# Patient Record
Sex: MALE | Race: WHITE
[De-identification: names, ages, dates, MRNs, and addresses within clinical notes are randomized per-mention and may not be internally consistent; named-entity substitution may affect disease eponyms.]

---

## 2018-06-14 ENCOUNTER — HOSPITAL ENCOUNTER (OUTPATIENT)
Dept: HOSPITAL 75 - RAD | Age: 48
End: 2018-06-14
Attending: NURSE PRACTITIONER
Payer: COMMERCIAL

## 2018-06-14 DIAGNOSIS — R10.84: Primary | ICD-10-CM

## 2018-06-14 PROCEDURE — 76700 US EXAM ABDOM COMPLETE: CPT

## 2018-06-14 NOTE — DIAGNOSTIC IMAGING REPORT
PROCEDURE: US abdomen complete.



TECHNIQUE: Multiple real-time grayscale images were obtained over

the abdomen in various projections.



INDICATION: Generalized abdominal pain.



The pancreas has a normal appearance. The liver is normal in

size. No discrete liver mass is identified. Gallbladder is

without stones or sludge. No wall thickening or pericholecystic

fluid is seen. No biliary duct dilatation is identified. The

right and left kidneys are unremarkable. Aorta and IVC are

unremarkable. There is no ascites.



IMPRESSION: Unremarkable abdominal ultrasound.



Dictated by: 



  Dictated on workstation # [id number]

## 2022-02-22 ENCOUNTER — HOSPITAL ENCOUNTER (OUTPATIENT)
Dept: HOSPITAL 75 - RAD | Age: 52
End: 2022-02-22
Attending: NURSE PRACTITIONER
Payer: COMMERCIAL

## 2022-02-22 DIAGNOSIS — M89.8X7: ICD-10-CM

## 2022-02-22 DIAGNOSIS — L02.611: Primary | ICD-10-CM

## 2022-02-22 PROCEDURE — 73660 X-RAY EXAM OF TOE(S): CPT

## 2022-02-22 PROCEDURE — 73630 X-RAY EXAM OF FOOT: CPT

## 2022-02-22 NOTE — DIAGNOSTIC IMAGING REPORT
INDICATION:  ABSCESS RT FOOT BETWEEN 4/5 TOES SEVERE 4TH TOE BONE

PAIN.



TECHNIQUE: 3 views of the right foot  



CORRELATION STUDY: 

None



FINDINGS:  

No acute fracture. There is slight blunting at the tip of the 5th

distal phalanx. The remaining osseous structures are otherwise

intact. Joint space is maintained.  No abnormal soft tissue gas

collection or radiopaque foreign body.



IMPRESSION:

1. Slight blunting of the tip of distal 5th phalanx. While this

may be normal variation, some degree of bony resorption including

osteomyelitis is not excluded. Remaining osseous structures are

otherwise intact and unremarkable.



Dictated by: 



  Dictated on workstation # [id number]

## 2022-02-22 NOTE — DIAGNOSTIC IMAGING REPORT
INDICATION: 

Soft tissue abscess. 



COMPARISON: 

Foot exam from same day.



FINDINGS: 

Three views of the right toes were obtained and show no

fractures, dislocations, or other acute bony abnormalities. Joint

spaces are well maintained throughout. The soft tissues appear

unremarkable. No radiopaque foreign bodies are identified.



IMPRESSION: 

Unremarkable radiographic exam of the toes of the right foot.

Please note, osteomyelitis cannot be excluded based on

radiographs alone. If there is concern for osteomyelitis, MRI is

recommended. If MRI is contraindicated, a triple phase bone scan

could be performed.



Dictated by: 



  Dictated on workstation # [id number]

## 2022-03-01 ENCOUNTER — HOSPITAL ENCOUNTER (OUTPATIENT)
Dept: HOSPITAL 75 - RAD | Age: 52
End: 2022-03-01
Attending: NURSE PRACTITIONER
Payer: COMMERCIAL

## 2022-03-01 DIAGNOSIS — L02.611: Primary | ICD-10-CM

## 2022-03-01 DIAGNOSIS — M86.271: ICD-10-CM

## 2022-03-01 NOTE — DIAGNOSTIC IMAGING REPORT
Exam: MRI right foot without contrast.



Date: March 1, 2022.



Indication: 51-year-old male, concern for abscess or ulcer

between the fourth and fifth toes of the right foot. Right foot

pain.



Comparison: Radiographs February 22, 2022.



Technique: Multiple noncontrast MRI sequences of the right foot

were obtained.



Findings:



Lisfranc ligament proper is intact. The imaged portions of the

peroneal tendons, anterior extensor tendons, and posterior flexor

tendons are intact. There is no evidence of tenosynovitis. The

visualized portions of the plantar fascia are intact.



There is no identified T1 marrow signal loss or bone destruction.

There is no identified marrow edema. There is no acute fracture.



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

joint. The additional joint spaces are well preserved. There is

no joint effusion.



There is no identified abnormal soft tissue edema or focal fluid

collection. Sensitivity for detection of abscess is somewhat

reduced given lack of intravenous contrast.



Impression:

1. No identified focal fluid collection or abscess.

2. No evidence of osteomyelitis.

3. Otherwise identified acute abnormality at the level of the

right foot.



Dictated by: 



  Dictated on workstation # [id number]

## 2023-06-27 ENCOUNTER — HOSPITAL ENCOUNTER (OUTPATIENT)
Dept: HOSPITAL 75 - CARD | Age: 53
End: 2023-06-27
Attending: SURGERY
Payer: COMMERCIAL

## 2023-06-27 DIAGNOSIS — R10.11: Primary | ICD-10-CM

## 2023-06-27 PROCEDURE — 78227 HEPATOBIL SYST IMAGE W/DRUG: CPT

## 2023-06-27 NOTE — DIAGNOSTIC IMAGING REPORT
INDICATION: Right upper quadrant pain



COMPARISON:  Abdominal ultrasound of 06/14/2018



TECHNIQUE: Anterior scintigraphic imaging of the abdomen was

performed after the intravenous administration of 5.39 mCi Tc-99m

Choletec.



FINDINGS:  The upper abdomen was imaged for 60 minutes with the

gamma camera. There is prompt homogeneous uptake of

radiopharmaceutical by the liver. There is activity in the common

duct and gallbladder by 30 minutes. Small bowel activity is seen

by 20 minutes.



After 60 minutes, the patient received 8 oz of ensure by mouth. 

After 60 minutes, the gallbladder ejection fraction was

calculated to be 86% which is normal.



IMPRESSION:  

1. Patent common and cystic bile ducts.

2. No gallbladder dysfunction. 



Dictated by: 



  Dictated on workstation # [id number]

## 2023-07-05 ENCOUNTER — HOSPITAL ENCOUNTER (OUTPATIENT)
Dept: HOSPITAL 75 - PREOP | Age: 53
LOS: 1 days | Discharge: HOME | End: 2023-07-06
Attending: SURGERY
Payer: COMMERCIAL

## 2023-07-05 VITALS — HEIGHT: 66.02 IN | WEIGHT: 154.98 LBS | BODY MASS INDEX: 24.91 KG/M2

## 2023-07-05 DIAGNOSIS — Z01.818: Primary | ICD-10-CM

## 2023-07-07 ENCOUNTER — HOSPITAL ENCOUNTER (OUTPATIENT)
Dept: HOSPITAL 75 - SDC | Age: 53
Discharge: HOME | End: 2023-07-07
Attending: SURGERY
Payer: COMMERCIAL

## 2023-07-07 VITALS — SYSTOLIC BLOOD PRESSURE: 130 MMHG | DIASTOLIC BLOOD PRESSURE: 80 MMHG

## 2023-07-07 VITALS — DIASTOLIC BLOOD PRESSURE: 67 MMHG | SYSTOLIC BLOOD PRESSURE: 106 MMHG

## 2023-07-07 VITALS — SYSTOLIC BLOOD PRESSURE: 132 MMHG | DIASTOLIC BLOOD PRESSURE: 80 MMHG

## 2023-07-07 VITALS — DIASTOLIC BLOOD PRESSURE: 81 MMHG | SYSTOLIC BLOOD PRESSURE: 119 MMHG

## 2023-07-07 VITALS — BODY MASS INDEX: 24.91 KG/M2 | HEIGHT: 66.02 IN | WEIGHT: 154.98 LBS

## 2023-07-07 VITALS — SYSTOLIC BLOOD PRESSURE: 123 MMHG | DIASTOLIC BLOOD PRESSURE: 95 MMHG

## 2023-07-07 VITALS — DIASTOLIC BLOOD PRESSURE: 90 MMHG | SYSTOLIC BLOOD PRESSURE: 137 MMHG

## 2023-07-07 VITALS — DIASTOLIC BLOOD PRESSURE: 96 MMHG | SYSTOLIC BLOOD PRESSURE: 141 MMHG

## 2023-07-07 VITALS — DIASTOLIC BLOOD PRESSURE: 79 MMHG | SYSTOLIC BLOOD PRESSURE: 125 MMHG

## 2023-07-07 VITALS — DIASTOLIC BLOOD PRESSURE: 89 MMHG | SYSTOLIC BLOOD PRESSURE: 132 MMHG

## 2023-07-07 VITALS — SYSTOLIC BLOOD PRESSURE: 124 MMHG | DIASTOLIC BLOOD PRESSURE: 82 MMHG

## 2023-07-07 VITALS — SYSTOLIC BLOOD PRESSURE: 125 MMHG | DIASTOLIC BLOOD PRESSURE: 77 MMHG

## 2023-07-07 DIAGNOSIS — F17.210: ICD-10-CM

## 2023-07-07 DIAGNOSIS — K82.8: Primary | ICD-10-CM

## 2023-07-07 DIAGNOSIS — K81.1: ICD-10-CM

## 2023-07-07 PROCEDURE — 88304 TISSUE EXAM BY PATHOLOGIST: CPT

## 2023-07-07 PROCEDURE — 76000 FLUOROSCOPY <1 HR PHYS/QHP: CPT

## 2023-07-07 PROCEDURE — 87081 CULTURE SCREEN ONLY: CPT

## 2023-07-07 PROCEDURE — 94664 DEMO&/EVAL PT USE INHALER: CPT

## 2023-07-07 NOTE — DIAGNOSTIC IMAGING REPORT
EXAMINATION: Intraoperative cholangiogram.



TECHNIQUE:

Intraoperative cholangiogram.



HISTORY: Cholecystectomy



COMPARISON: None available.



FINDINGS/

impression: 



Intraoperative cholangiogram demonstrates no filling defect or

contrast extravasation.



Fluoroscopy time: 10.9 seconds dose: 5.5 mgy



Dictated by: 



  Dictated on workstation # [id number]

## 2023-07-07 NOTE — PROGRESS NOTE-PRE OPERATIVE
Pre-Operative Progress Note


Date H&P Reviewed:  Jul 7, 2023


Time H&P Reviewed:  08:59


History & Physical:  H&P Reviewed, Patient Examed, No changes noted


Pre-Operative Diagnosis:  biliary dyskinesia











DAVE POWERS DO               Jul 7, 2023 09:10

## 2023-07-07 NOTE — ANESTHESIA-GENERAL POST-OP
General


Patient Condition


Mental Status/LOC:  Same as Preop


Cardiovascular:  Satisfactory


Nausea/Vomiting:  Absent


Respiratory:  Satisfactory


Pain:  Controlled


Complications:  Absent





Post Op Complications


Complications


None





Follow Up Care/Instructions


Patient Instructions


None needed.





Anesthesia/Patient Condition


Patient Condition


Patient is doing well, no complaints, stable vital signs, no apparent adverse 

anesthesia problems.   


No complications reported per nursing.











DANTE COHEN CRNA          Jul 7, 2023 11:08

## 2023-07-07 NOTE — PROGRESS NOTE-POST OPERATIVE
Post-Operative Progess Note


Surgeon (s)/Assistant (s)


Surgeon


DAVE POWERS DO


Assistant:  Dr. Valdovinos to assist in retraction dissection and closure.





Pre-Operative Diagnosis


biliary dyskinesia





Post-Operative Diagnosis





same





Procedure & Operative Findings


Date of Procedure


7/7/23


Procedure Performed/Findings


  


PROCEDURE:


Laparoscopic cholecystectomy with intraoperative


cholangiogram. 


 


COMPLICATIONS:


None. 


 


PROCEDURE:


The patient was taken to the operating suite and was prepped and


draped in sterile fashion. A surgical pause was performed. Just


superior to the umbilicus, a 12 mm incision was made. Dissection


was taken down to the fascia, which was then scored and grasped


with a Kocher and the abdomen was then entered.  A 0 Vicryl


suture was placed in a figure-of-eight fashion and a Bain


trocar was placed and secured. Pneumoperitoneum was achieved.


A 5mm trochar place in the subxyphoid and 2 in the right upper quadrant.


The gallbladder was then grasped and elevated. Adhesions to the gallbladder


were taken down with blunt and cautery dissection. The


cystic duct, and cystic artery were then 


dissected out. Clip was placed on the distal


portion of the cystic duct which was then partially transected.


An arrow catheter was inserted into the duct. 


The cholangiogram was then performed. No filing defects and contrast


made its way into the duodenum.  Catheter removed. Clips were placed


on proximal portion of the cystic duct and then the duct was then


transected. Clips were placed along the proximal and distal


portion of the cystic artery which was then transected. Hook


cautery was used to dissect the gallbladder from the gallbladder


fossa achieving hemostasis. The gallbladder was placed in an


Endobag and removed through the 12 mm trocar site. The abdomen


was then reinspected.  Copious amounts of irrigation were used to


irrigate the abdomen and there were no signs of active bleeding.


Hemostasis had been achieved. The 12 mm fascial defect was then


closed with 0 Vicryl suture that had been placed in a


figure-of-eight fashion. The abdomen was then desufflated, the


trocars were removed. The abdomen was then washed and dried. The


skin was then closed using 4-0 Monocryl in a subcuticular fashion.


The abdomen was washed and dried and Skin Affix was place over incisions.


Patient tolerated the procedure well without any complications 


and was taken to the recovery room in stable condition.


Anesthesia Type


general





Estimated Blood Loss


Estimated blood loss (mL):  minimal





Specimens/Packing


Specimens Removed


gallbladder











DAVE POWERS DO               Jul 7, 2023 10:44

## 2023-07-07 NOTE — DISCHARGE INST-SIMPLE/STANDARD
Discharge Inst-Standard


Discharge Medications


New, Converted or Re-Newed RX:  Transmitted to Pharmacy





Patient Instructions/Follow Up


Plan of Care/Instructions/FU:  


2 weeks camilla


Activity as Tolerated:  No


Discharge Diet:  Regular Diet


Other Inst to Patient


Follow up Appt:


Make appointment for 2 weeks.





Instructions:


No lifting greater than 10 pounds.


No strenuous activity. 


May shower in 24 hours, no tub bath or soaking.


Use incentive spirometer at home as directed.


No Smoking





Skin/Wound Care:


You have special glue over incision, it will fall off on it's own.





Symptoms to Report:


Appetite Changes, Extremity Discoloration, Numbness/Tingling, Swelling 

Increased, Bleeding Excessive, Eyesight Changes, Pain Increased, Urine Color 

Change, Constipation(Persistent), Fever over 101 degree F, Pain/Pressure in 

chest, Urinating Difficulty, Cough Up/Vomit Blood, Heart Beat Irreg/Pounding, 

Pain/Pressure in jaw, Vaginal Bleeding Increase, Cramps in feet or legs, 

Lightheadedness, Pain/Pressure in shoulder, Diarrhea(Persistent), Memory Changes

Suddenly, Questions/Concerns, Weight gain consecutive days, Dizziness/Fainting, 

Nausea/Vomiting, Shortness of Breath, Weight gain over 2 pounds.





If eyes or skin turn yellow notify physician.








If questions or concerns contact your physician 


Or seek help at emergency department.











DAVE POWERS DO               Jul 7, 2023 10:46

## 2023-08-23 ENCOUNTER — HOSPITAL ENCOUNTER (OUTPATIENT)
Dept: HOSPITAL 75 - PREOP | Age: 53
Discharge: HOME | End: 2023-08-23
Attending: SURGERY
Payer: COMMERCIAL

## 2023-08-23 VITALS — BODY MASS INDEX: 24.91 KG/M2 | WEIGHT: 154.98 LBS | HEIGHT: 65.98 IN

## 2023-08-23 DIAGNOSIS — Z01.818: Primary | ICD-10-CM

## 2023-09-05 ENCOUNTER — HOSPITAL ENCOUNTER (OUTPATIENT)
Dept: HOSPITAL 75 - ENDO | Age: 53
Discharge: HOME | End: 2023-09-05
Attending: SURGERY
Payer: COMMERCIAL

## 2023-09-05 VITALS — DIASTOLIC BLOOD PRESSURE: 95 MMHG | SYSTOLIC BLOOD PRESSURE: 119 MMHG

## 2023-09-05 VITALS — DIASTOLIC BLOOD PRESSURE: 72 MMHG | SYSTOLIC BLOOD PRESSURE: 100 MMHG

## 2023-09-05 VITALS — SYSTOLIC BLOOD PRESSURE: 115 MMHG | DIASTOLIC BLOOD PRESSURE: 95 MMHG

## 2023-09-05 VITALS — DIASTOLIC BLOOD PRESSURE: 69 MMHG | SYSTOLIC BLOOD PRESSURE: 102 MMHG

## 2023-09-05 VITALS — DIASTOLIC BLOOD PRESSURE: 77 MMHG | SYSTOLIC BLOOD PRESSURE: 113 MMHG

## 2023-09-05 VITALS — HEIGHT: 65.75 IN | WEIGHT: 154.98 LBS | BODY MASS INDEX: 25.21 KG/M2

## 2023-09-05 DIAGNOSIS — K21.00: ICD-10-CM

## 2023-09-05 DIAGNOSIS — K29.70: ICD-10-CM

## 2023-09-05 DIAGNOSIS — Z79.899: ICD-10-CM

## 2023-09-05 DIAGNOSIS — Z12.11: Primary | ICD-10-CM

## 2023-09-05 DIAGNOSIS — F17.210: ICD-10-CM

## 2023-09-05 DIAGNOSIS — K31.89: ICD-10-CM

## 2023-09-05 DIAGNOSIS — K63.5: ICD-10-CM

## 2023-09-05 NOTE — ANESTHESIA-GENERAL POST-OP
MAC


Patient Condition


Mental Status/LOC:  Same as Preop


Cardiovascular:  Satisfactory


Nausea/Vomiting:  Absent


Respiratory:  Satisfactory


Pain:  Controlled


Complications:  Absent





Post Op Complications


Complications


None





Follow Up Care/Instructions


Patient Instructions


None needed.





Anesthesiology Discharge Order


Discharge Order


Patient is doing well, no complaints, stable vital signs, no apparent adverse 

anesthesia problems.   


No complications reported per nursing.











DANTE COHEN CRNA          Sep 5, 2023 12:59

## 2023-09-05 NOTE — PROGRESS NOTE-POST OPERATIVE
Post-Operative Progess Note


Surgeon (s)/Assistant (s)


Surgeon


DAVE POWERS DO


Assistant:  NA





Pre-Operative Diagnosis


GERD, screening colonscopy





Post-Operative Diagnosis





Gastritis


Sigmoid polyp





Procedure & Operative Findings


Date of Procedure


9/5/23


Procedure Performed/Findings


EGD wuth biopsy


Colonoscopy hot biopsy


polypectomy x1


Anesthesia Type


per CRNA





Estimated Blood Loss


Estimated blood loss (mL):  none





Specimens/Packing


Specimens Removed


antrum GE and polyp











DAVE POWERS DO               Sep 5, 2023 09:09

## 2023-09-05 NOTE — OPERATIVE REPORT
DATE OF SERVICE: 09/05/2023



PREOPERATIVE DIAGNOSES:

Gastroesophageal reflux disease, screening colonoscopy.



POSTOPERATIVE DIAGNOSES:

Gastritis, sigmoid polyp.



SURGEON:

Dave Taylor DO



ANESTHESIA:

Per CRNA.



ESTIMATED BLOOD LOSS:

None.



COMPLICATIONS:

None.



PROCEDURES:

EGD with biopsies, colonoscopy with hot biopsy polypectomy x1.



ESTIMATED BLOOD LOSS:

None.



COMPLICATIONS:

None.



INDICATIONS:

The patient is a 53-year-old male with GERD symptoms and needing for a 

colonoscopy.  He understands risks and benefits of procedure and wishes to 

proceed.  Consent was signed in chart.



DESCRIPTION OF PROCEDURE:

The patient was taken to the endoscopy suite, placed in left lateral recumbent 

position.  Timeout was performed.  Scope was inserted in the mouth, down the 

esophagus, stomach and into the duodenum without difficulty.  There were no 

polyps, masses or ulcerations within the duodenum.  Scope was slowly retracted 

back into stomach where it was further insufflated.  Erythematous changes 

consistent with some slight gastritis present.  Biopsies of the antrum were 

obtained.  Scope was retroflexed noting no other pathology.  Scope was returned 

to its normal position, slowly withdrawn until the distal esophagus.  Biopsy of 

GE junction was obtained.  Scope was slowly retracted back until completely 

remove noting no other pathology.  Digital rectal exam was performed.  No 

palpable polyps, masses or ulcerations.  Scope was inserted in the rectum all 

the way to the cecum with minimal difficulty.  Prep was adequate.  Scope was 

slowly retracted back.  No polyps, masses or ulcerations in the cecum, 

ascending, transverse, descending and sigmoid colon.  In the sigmoid colon, a 

small polyp present, which hot [_____].  Scope was then slowly retracted back 

into the rectum, where it was also retroflexed noting no other pathology.  The 

scope was then returned to its normal position, slowly withdrawn until 

completely removed.  The patient tolerated the procedure well without 

complications, taken to recovery room in stable condition.



RECOMMENDATIONS:

The patient will continue on current medications, which he is on Protonix. We 

will add Carafate 1 gram 4 times a day.  He will follow up in 2 weeks to discuss

pathology results and will need repeat colonoscopy in 5 years due to colon 

polyps.  Any issues before that, be seen at that time.





Job ID: [id number]

DocumentID: [id number]

Dictated Date: 09/05/2023 09:11:01

Transcription Date: 09/05/2023 13:48:00

Dictated By: DAVE TAYLOR DO